# Patient Record
Sex: FEMALE | Race: WHITE | NOT HISPANIC OR LATINO
[De-identification: names, ages, dates, MRNs, and addresses within clinical notes are randomized per-mention and may not be internally consistent; named-entity substitution may affect disease eponyms.]

---

## 2017-02-24 ENCOUNTER — APPOINTMENT (OUTPATIENT)
Dept: HEART AND VASCULAR | Facility: CLINIC | Age: 52
End: 2017-02-24

## 2017-02-24 VITALS
OXYGEN SATURATION: 96 % | RESPIRATION RATE: 12 BRPM | SYSTOLIC BLOOD PRESSURE: 120 MMHG | BODY MASS INDEX: 24.03 KG/M2 | HEART RATE: 89 BPM | WEIGHT: 140 LBS | DIASTOLIC BLOOD PRESSURE: 80 MMHG | TEMPERATURE: 98.3 F

## 2017-02-24 DIAGNOSIS — F41.9 ANXIETY DISORDER, UNSPECIFIED: ICD-10-CM

## 2017-02-24 DIAGNOSIS — G47.00 INSOMNIA, UNSPECIFIED: ICD-10-CM

## 2017-02-24 RX ORDER — ZOLPIDEM TARTRATE 5 MG/1
5 TABLET ORAL
Qty: 20 | Refills: 0 | Status: ACTIVE | COMMUNITY
Start: 2017-02-24 | End: 1900-01-01

## 2017-05-31 ENCOUNTER — RESULT REVIEW (OUTPATIENT)
Age: 52
End: 2017-05-31

## 2018-08-23 ENCOUNTER — APPOINTMENT (OUTPATIENT)
Dept: OBGYN | Facility: CLINIC | Age: 53
End: 2018-08-23
Payer: COMMERCIAL

## 2018-08-23 VITALS
BODY MASS INDEX: 25.27 KG/M2 | HEIGHT: 64 IN | DIASTOLIC BLOOD PRESSURE: 82 MMHG | SYSTOLIC BLOOD PRESSURE: 120 MMHG | WEIGHT: 148 LBS

## 2018-08-23 DIAGNOSIS — Z82.49 FAMILY HISTORY OF ISCHEMIC HEART DISEASE AND OTHER DISEASES OF THE CIRCULATORY SYSTEM: ICD-10-CM

## 2018-08-23 DIAGNOSIS — Z78.9 OTHER SPECIFIED HEALTH STATUS: ICD-10-CM

## 2018-08-23 DIAGNOSIS — Z83.3 FAMILY HISTORY OF DIABETES MELLITUS: ICD-10-CM

## 2018-08-23 DIAGNOSIS — Z87.891 PERSONAL HISTORY OF NICOTINE DEPENDENCE: ICD-10-CM

## 2018-08-23 PROCEDURE — 99386 PREV VISIT NEW AGE 40-64: CPT

## 2018-09-06 ENCOUNTER — LABORATORY RESULT (OUTPATIENT)
Age: 53
End: 2018-09-06

## 2018-09-06 ENCOUNTER — APPOINTMENT (OUTPATIENT)
Dept: OBGYN | Facility: CLINIC | Age: 53
End: 2018-09-06
Payer: COMMERCIAL

## 2018-09-06 VITALS
BODY MASS INDEX: 25.31 KG/M2 | WEIGHT: 148.25 LBS | HEIGHT: 64 IN | SYSTOLIC BLOOD PRESSURE: 120 MMHG | DIASTOLIC BLOOD PRESSURE: 70 MMHG

## 2018-09-06 DIAGNOSIS — A63.0 ANOGENITAL (VENEREAL) WARTS: ICD-10-CM

## 2018-09-06 PROCEDURE — 11100 BX SKIN SUBCUTANEOUS&/MUCOUS MEMBRANE 1 LESION: CPT

## 2018-09-06 PROCEDURE — 99214 OFFICE O/P EST MOD 30 MIN: CPT | Mod: 25

## 2018-09-10 LAB
CYTOLOGY CVX/VAG DOC THIN PREP: NORMAL
HPV HIGH+LOW RISK DNA PNL CVX: NOT DETECTED

## 2018-09-18 ENCOUNTER — APPOINTMENT (OUTPATIENT)
Dept: OBGYN | Facility: CLINIC | Age: 53
End: 2018-09-18
Payer: COMMERCIAL

## 2018-09-18 DIAGNOSIS — N95.0 POSTMENOPAUSAL BLEEDING: ICD-10-CM

## 2018-09-18 PROCEDURE — 99214 OFFICE O/P EST MOD 30 MIN: CPT | Mod: 25

## 2018-09-18 PROCEDURE — 58100 BIOPSY OF UTERUS LINING: CPT

## 2018-10-09 ENCOUNTER — APPOINTMENT (OUTPATIENT)
Dept: COLORECTAL SURGERY | Facility: CLINIC | Age: 53
End: 2018-10-09
Payer: COMMERCIAL

## 2018-10-09 VITALS
SYSTOLIC BLOOD PRESSURE: 105 MMHG | BODY MASS INDEX: 24.75 KG/M2 | DIASTOLIC BLOOD PRESSURE: 69 MMHG | HEART RATE: 75 BPM | TEMPERATURE: 98.6 F | WEIGHT: 145 LBS | HEIGHT: 64 IN

## 2018-10-09 PROCEDURE — 46924 DESTRUCTION ANAL LESION(S): CPT

## 2018-10-09 PROCEDURE — 99202 OFFICE O/P NEW SF 15 MIN: CPT | Mod: 25

## 2019-10-30 ENCOUNTER — APPOINTMENT (OUTPATIENT)
Dept: OBGYN | Facility: CLINIC | Age: 54
End: 2019-10-30

## 2019-11-22 ENCOUNTER — APPOINTMENT (OUTPATIENT)
Dept: OBGYN | Facility: CLINIC | Age: 54
End: 2019-11-22

## 2019-12-02 ENCOUNTER — APPOINTMENT (OUTPATIENT)
Dept: OBGYN | Facility: CLINIC | Age: 54
End: 2019-12-02
Payer: COMMERCIAL

## 2019-12-02 VITALS
DIASTOLIC BLOOD PRESSURE: 70 MMHG | WEIGHT: 144 LBS | HEIGHT: 64 IN | BODY MASS INDEX: 24.59 KG/M2 | SYSTOLIC BLOOD PRESSURE: 100 MMHG

## 2019-12-02 DIAGNOSIS — N95.1 MENOPAUSAL AND FEMALE CLIMACTERIC STATES: ICD-10-CM

## 2019-12-02 DIAGNOSIS — A63.0 ANOGENITAL (VENEREAL) WARTS: ICD-10-CM

## 2019-12-02 PROCEDURE — 99396 PREV VISIT EST AGE 40-64: CPT

## 2019-12-03 PROBLEM — A63.0 CONDYLOMA ACUMINATUM OF ANUS: Status: ACTIVE | Noted: 2018-10-09

## 2019-12-03 PROBLEM — N95.1 VASOMOTOR SYMPTOMS DUE TO MENOPAUSE: Status: ACTIVE | Noted: 2018-08-23

## 2019-12-03 LAB — HPV HIGH+LOW RISK DNA PNL CVX: NOT DETECTED

## 2019-12-03 NOTE — PHYSICAL EXAM
[Alert] : alert [Awake] : awake [Acute Distress] : no acute distress [Mass] : no breast mass [Axillary LAD] : no axillary lymphadenopathy [Nipple Discharge] : no nipple discharge [Soft] : soft [Tender] : non tender [Oriented x3] : oriented to person, place, and time [Normal] : uterus [No Bleeding] : there was no active vaginal bleeding [Pap Obtained] : a Pap smear was performed [Uterine Adnexae] : were not tender and not enlarged

## 2019-12-05 LAB — CYTOLOGY CVX/VAG DOC THIN PREP: ABNORMAL

## 2021-04-07 ENCOUNTER — APPOINTMENT (OUTPATIENT)
Dept: OBGYN | Facility: CLINIC | Age: 56
End: 2021-04-07
Payer: MEDICAID

## 2021-04-07 PROCEDURE — 99213 OFFICE O/P EST LOW 20 MIN: CPT

## 2021-07-15 ENCOUNTER — APPOINTMENT (OUTPATIENT)
Dept: COLORECTAL SURGERY | Facility: CLINIC | Age: 56
End: 2021-07-15
Payer: MEDICAID

## 2021-07-15 VITALS
WEIGHT: 152 LBS | HEIGHT: 64 IN | SYSTOLIC BLOOD PRESSURE: 122 MMHG | TEMPERATURE: 97.3 F | BODY MASS INDEX: 25.95 KG/M2 | HEART RATE: 82 BPM | DIASTOLIC BLOOD PRESSURE: 81 MMHG

## 2021-07-15 DIAGNOSIS — L29.0 PRURITUS ANI: ICD-10-CM

## 2021-07-15 DIAGNOSIS — K64.9 UNSPECIFIED HEMORRHOIDS: ICD-10-CM

## 2021-07-15 PROCEDURE — 46600 DIAGNOSTIC ANOSCOPY SPX: CPT

## 2021-07-15 RX ORDER — OMEGA-3/DHA/EPA/FISH OIL 300-1000MG
CAPSULE ORAL
Refills: 0 | Status: ACTIVE | COMMUNITY

## 2021-07-15 RX ORDER — HYDROCORTISONE 25 MG/G
2.5 OINTMENT TOPICAL TWICE DAILY
Qty: 30 | Refills: 2 | Status: ACTIVE | COMMUNITY
Start: 2021-07-15 | End: 1900-01-01

## 2021-07-15 RX ORDER — PAROXETINE 7.5 MG/1
7.5 CAPSULE ORAL
Qty: 30 | Refills: 2 | Status: DISCONTINUED | COMMUNITY
Start: 2019-12-03 | End: 2021-07-15

## 2021-07-15 RX ORDER — ESTRADIOL AND NORETHINDRONE ACETATE 1; .5 MG/1; MG/1
1-0.5 TABLET, FILM COATED ORAL DAILY
Qty: 1 | Refills: 6 | Status: DISCONTINUED | COMMUNITY
Start: 2018-08-23 | End: 2021-07-15

## 2021-07-15 RX ORDER — CLOTRIMAZOLE AND BETAMETHASONE DIPROPIONATE 10; .5 MG/G; MG/G
1-0.05 CREAM TOPICAL TWICE DAILY
Qty: 1 | Refills: 1 | Status: ACTIVE | COMMUNITY
Start: 2021-07-15 | End: 1900-01-01

## 2021-07-15 RX ORDER — ASCORBIC ACID 500 MG
TABLET ORAL
Refills: 0 | Status: ACTIVE | COMMUNITY

## 2021-07-15 RX ORDER — CHROMIUM 200 MCG
TABLET ORAL
Refills: 0 | Status: ACTIVE | COMMUNITY

## 2021-07-15 RX ORDER — VIT C/ZN GLUC/HERBAL NO.325 90 MG-15MG
LOZENGE MUCOUS MEMBRANE
Refills: 0 | Status: ACTIVE | COMMUNITY

## 2021-07-15 NOTE — ASSESSMENT
[FreeTextEntry1] : Exam findings and diagnosis were discussed at length with patient.\par Recommend avoid scratching, avoid over-cleaning, and decreasing/eliminating triggers, such as caffeine and alcohol.\par Discussed perianal hygiene and topical barrier agents - Balmex.\par Recommend a trial of topical therapy, including hydrocortisone and lotrisone .\par If symptoms persist or worsen, patient will f/u in 4 weeks for reevaluation\par All questions answered, patient expressed understanding and is agreeable to this plan.

## 2021-07-15 NOTE — PHYSICAL EXAM
[FreeTextEntry1] : Medical assistant present for duration of physical examination\par \par General no acute distress, alert and oriented\par Psych calm, pleasant demeanor, responding appropriately to questions\par Nonlabored breathing\par Ambulating without assistance\par \par Skin Local perianal dermatitis with white plaques, red excoriations, chronic edema, consistent with pruritus ani\par \par Anorectal Exam:\par Inspection perianal skin findings as above, right anterior external hemorrhoidal tag nonthrombosed\par AMAURY nontender, no masses palpated, no blood on gloved finger\par \par Procedure: Anoscopy\par \par Pre procedure Diagnosis: pruritus, hemorrhoids\par Post procedure Diagnosis: pruritus, hemorrhoids\par Anesthesia: none\par Estimated blood loss: none\par Specimen: none\par Complications: none\par \par Consent obtained. Anoscopy was performed by passing a lighted anoscope with lubricant jelly into the anal canal and the entire anal mucosal surface was inspected. Findings included no fissure, mild internal hemorrhoids, no visible masses or lesions\par \par Patient tolerated examination and procedure well.\par \par \par \par \par \par

## 2021-07-15 NOTE — HISTORY OF PRESENT ILLNESS
[FreeTextEntry1] : 54 yo F presents for f/u anal condyloma and evaluation of hemorrhoids\par PSH of office based fulguration of anogenital lesions\par s/p  x 2 w/ episiotomies (children aged 27 and 23)\par \par Last seen in the office on 10/9/18. Minimal external hemorrhoids non thrombosed and several 1-2 mm condylomatous lesions in the left anterior perineum overlying the skin noted on external exam. Non friable internal hemorrhoids seen on anoscopy. Fulguration of lesions performed \par \par Pt c/o hemorrhoids began after delivering 2nd child 25 years ago, treated w/ Prep H in the past\par \par Current flare began 3 months ago w/o obvious aggravating factor and patient noticed swollen tissue. She started Prep H at bedtime for the last week w/o noticeable improvement\par Also believes warts may have returned and c/o itching\par Denies bleeding or burning\par c/o itching throughout the day and worsened at bedtime\par Admits to over wiping after BMs w/ wet wipes. \par Not applying any topical creams besides Prep H\par BH: once daily, no complaints\par Follows vegetarian diet and reports adequate dietary fiber and water intake \par Takes fiber supplement daily\par (+) h/o anal receptive sex\par Never had an anal pap\par Last cervical pap smear completed 19 was negative HRHPV. Pt has f/u with GYN Claudia Salazar in 1-2 weeks\par Last colonoscopy completed , no abnormal findings per patient \par Denies ASA/NSAID use in last 7 days

## 2021-07-20 ENCOUNTER — APPOINTMENT (OUTPATIENT)
Dept: OBGYN | Facility: CLINIC | Age: 56
End: 2021-07-20
Payer: MEDICAID

## 2021-07-20 VITALS
WEIGHT: 151 LBS | BODY MASS INDEX: 25.78 KG/M2 | SYSTOLIC BLOOD PRESSURE: 140 MMHG | DIASTOLIC BLOOD PRESSURE: 80 MMHG | HEIGHT: 64 IN

## 2021-07-20 DIAGNOSIS — Z00.00 ENCOUNTER FOR GENERAL ADULT MEDICAL EXAMINATION W/OUT ABNORMAL FINDINGS: ICD-10-CM

## 2021-07-20 PROCEDURE — 99396 PREV VISIT EST AGE 40-64: CPT

## 2021-07-20 NOTE — PHYSICAL EXAM
[Appropriately responsive] : appropriately responsive [Alert] : alert [No Acute Distress] : no acute distress [No Lymphadenopathy] : no lymphadenopathy [Regular Rate Rhythm] : regular rate rhythm [No Murmurs] : no murmurs [Clear to Auscultation B/L] : clear to auscultation bilaterally [Soft] : soft [Non-tender] : non-tender [Non-distended] : non-distended [No HSM] : No HSM [No Lesions] : no lesions [No Mass] : no mass [Oriented x3] : oriented x3 [Examination Of The Breasts] : a normal appearance [No Masses] : no breast masses were palpable [Labia Majora] : normal [Labia Minora] : normal [Atrophy] : atrophy [Normal] : normal [Uterine Adnexae] : normal [External Hemorrhoid] : external hemorrhoid

## 2021-07-20 NOTE — HISTORY OF PRESENT ILLNESS
[Patient reported mammogram was normal] : Patient reported mammogram was normal [Patient reported PAP Smear was normal] : Patient reported PAP Smear was normal [postmenopausal] : postmenopausal [Post-Menopause, No Sxs] : post-menopausal, currently without symptoms [Previously active] : previously active [Men] : men [No] : No [FreeTextEntry1] : Patient is here for an annual visit without complaints.\par LISET TOVAR IS A 55 year OLD WHO PRESENTS FOR AN ANNUAL GYN EXAM.  SHE IS WITHOUT COMPLAINTS.\par MS. TOVAR REPORTS THAT SHE PRESENTED IN [facility] FOR HER ROUTINE EXAM BUT LEFT WITHOUT BEING SEEN BECAUSE OF THE WAIT.  \par SHE WAS RECENTLY SEEN BY DR. VILLA, GI FOR RECTAL EXAM, SCREENING COLONOSCOPY AND ANAL PAP DUE TO A RECENT FLARE OF HEMORRHOIDS.   [Mammogramdate] : 07/2021 [PapSmeardate] : 2019

## 2021-07-30 ENCOUNTER — APPOINTMENT (OUTPATIENT)
Dept: OTOLARYNGOLOGY | Facility: CLINIC | Age: 56
End: 2021-07-30
Payer: MEDICAID

## 2021-07-30 VITALS
TEMPERATURE: 97.6 F | DIASTOLIC BLOOD PRESSURE: 98 MMHG | HEIGHT: 64 IN | WEIGHT: 149 LBS | BODY MASS INDEX: 25.44 KG/M2 | HEART RATE: 96 BPM | SYSTOLIC BLOOD PRESSURE: 147 MMHG

## 2021-07-30 DIAGNOSIS — H91.93 UNSPECIFIED HEARING LOSS, BILATERAL: ICD-10-CM

## 2021-07-30 DIAGNOSIS — H61.23 IMPACTED CERUMEN, BILATERAL: ICD-10-CM

## 2021-07-30 PROCEDURE — 99203 OFFICE O/P NEW LOW 30 MIN: CPT | Mod: 25

## 2021-07-30 PROCEDURE — 69210 REMOVE IMPACTED EAR WAX UNI: CPT

## 2021-08-01 PROBLEM — H61.23 BILATERAL IMPACTED CERUMEN: Status: ACTIVE | Noted: 2021-08-01

## 2021-08-01 PROBLEM — H91.93 DIMINISHED HEARING, BILATERAL: Status: ACTIVE | Noted: 2021-08-01

## 2021-08-01 RX ORDER — AZITHROMYCIN 250 MG/1
250 TABLET, FILM COATED ORAL
Qty: 6 | Refills: 0 | Status: ACTIVE | COMMUNITY
Start: 2021-05-17

## 2021-08-01 RX ORDER — ZOLPIDEM TARTRATE 10 MG/1
10 TABLET ORAL
Qty: 15 | Refills: 0 | Status: ACTIVE | COMMUNITY
Start: 2021-07-01

## 2021-08-01 RX ORDER — AMMONIUM LACTATE 12 %
12 CREAM (GRAM) TOPICAL
Qty: 280 | Refills: 0 | Status: ACTIVE | COMMUNITY
Start: 2021-06-29

## 2021-08-01 NOTE — ASSESSMENT
[FreeTextEntry1] : 55F here for initial evaluation. She feels her ears are clogged with diminished hearing and was told there is cerumen. There is no otorrhea, otalgia, tinnitus or vertigo. On exam, obstructing cerumen was removed from either EAC w relief. The rest of the head and neck exam is unremarkable. She declined audiometry.\par Cerumen removed; RTO 9-12 months for ear exam/cleaning.

## 2021-08-01 NOTE — HISTORY OF PRESENT ILLNESS
[de-identified] : 55F here for initial evaluation.\par \par She feels her ears are clogged with diminished hearing and was told there is cerumen. There is no otorrhea, otalgia, tinnitus or vertigo. She has no other otolaryngologic complaints.\par \par ROS otherwise unremarkable.

## 2021-08-01 NOTE — PHYSICAL EXAM
[FreeTextEntry1] : Au: EAC occluded w cerumen removed w curet TM intact and mobile, ME clear [Normal] : no rashes

## 2021-08-01 NOTE — CONSULT LETTER
[Dear  ___] : Dear  [unfilled], [Courtesy Letter:] : I had the pleasure of seeing your patient, [unfilled], in my office today. [Consult Closing:] : Thank you very much for allowing me to participate in the care of this patient.  If you have any questions, please do not hesitate to contact me. [Sincerely,] : Sincerely, [FreeTextEntry3] : Jesús Ariza MD\par Department of Otolaryngology - Head and Neck Surgery\par [facility]

## 2021-08-03 LAB — CYTOLOGY CVX/VAG DOC THIN PREP: NORMAL

## 2022-04-21 ENCOUNTER — TRANSCRIPTION ENCOUNTER (OUTPATIENT)
Age: 57
End: 2022-04-21

## 2022-04-21 ENCOUNTER — EMERGENCY (EMERGENCY)
Facility: HOSPITAL | Age: 57
LOS: 1 days | Discharge: HOME | End: 2022-04-21
Admitting: EMERGENCY MEDICINE
Payer: MEDICAID

## 2022-04-21 VITALS
DIASTOLIC BLOOD PRESSURE: 88 MMHG | RESPIRATION RATE: 18 BRPM | TEMPERATURE: 98 F | OXYGEN SATURATION: 99 % | SYSTOLIC BLOOD PRESSURE: 132 MMHG | HEART RATE: 88 BPM

## 2022-04-21 VITALS
HEIGHT: 63 IN | OXYGEN SATURATION: 98 % | HEART RATE: 89 BPM | TEMPERATURE: 98 F | WEIGHT: 149.03 LBS | RESPIRATION RATE: 18 BRPM | SYSTOLIC BLOOD PRESSURE: 140 MMHG | DIASTOLIC BLOOD PRESSURE: 89 MMHG

## 2022-04-21 DIAGNOSIS — M54.6 PAIN IN THORACIC SPINE: ICD-10-CM

## 2022-04-21 DIAGNOSIS — Z98.89 OTHER SPECIFIED POSTPROCEDURAL STATES: Chronic | ICD-10-CM

## 2022-04-21 LAB
ALBUMIN SERPL ELPH-MCNC: 4.6 G/DL — SIGNIFICANT CHANGE UP (ref 3.3–5)
ALP SERPL-CCNC: 120 U/L — SIGNIFICANT CHANGE UP (ref 40–120)
ALT FLD-CCNC: 24 U/L — SIGNIFICANT CHANGE UP (ref 10–45)
ANION GAP SERPL CALC-SCNC: 18 MMOL/L — HIGH (ref 5–17)
APPEARANCE UR: CLEAR — SIGNIFICANT CHANGE UP
AST SERPL-CCNC: 29 U/L — SIGNIFICANT CHANGE UP (ref 10–40)
BACTERIA # UR AUTO: PRESENT /HPF
BILIRUB SERPL-MCNC: <0.2 MG/DL — SIGNIFICANT CHANGE UP (ref 0.2–1.2)
BILIRUB UR-MCNC: NEGATIVE — SIGNIFICANT CHANGE UP
BUN SERPL-MCNC: 11 MG/DL — SIGNIFICANT CHANGE UP (ref 7–23)
CALCIUM SERPL-MCNC: 9.8 MG/DL — SIGNIFICANT CHANGE UP (ref 8.4–10.5)
CHLORIDE SERPL-SCNC: 104 MMOL/L — SIGNIFICANT CHANGE UP (ref 96–108)
CO2 SERPL-SCNC: 18 MMOL/L — LOW (ref 22–31)
COLOR SPEC: YELLOW — SIGNIFICANT CHANGE UP
CREAT SERPL-MCNC: 0.61 MG/DL — SIGNIFICANT CHANGE UP (ref 0.5–1.3)
DIFF PNL FLD: ABNORMAL
EGFR: 104 ML/MIN/1.73M2 — SIGNIFICANT CHANGE UP
EPI CELLS # UR: SIGNIFICANT CHANGE UP /HPF (ref 0–5)
GLUCOSE SERPL-MCNC: 110 MG/DL — HIGH (ref 70–99)
GLUCOSE UR QL: NEGATIVE — SIGNIFICANT CHANGE UP
KETONES UR-MCNC: NEGATIVE — SIGNIFICANT CHANGE UP
LEUKOCYTE ESTERASE UR-ACNC: NEGATIVE — SIGNIFICANT CHANGE UP
LIDOCAIN IGE QN: 25 U/L — SIGNIFICANT CHANGE UP (ref 7–60)
NITRITE UR-MCNC: NEGATIVE — SIGNIFICANT CHANGE UP
PH UR: 6 — SIGNIFICANT CHANGE UP (ref 5–8)
POTASSIUM SERPL-MCNC: 4.9 MMOL/L — SIGNIFICANT CHANGE UP (ref 3.5–5.3)
POTASSIUM SERPL-SCNC: 4.9 MMOL/L — SIGNIFICANT CHANGE UP (ref 3.5–5.3)
PROT SERPL-MCNC: 7.4 G/DL — SIGNIFICANT CHANGE UP (ref 6–8.3)
PROT UR-MCNC: NEGATIVE MG/DL — SIGNIFICANT CHANGE UP
RBC CASTS # UR COMP ASSIST: < 5 /HPF — SIGNIFICANT CHANGE UP
SODIUM SERPL-SCNC: 140 MMOL/L — SIGNIFICANT CHANGE UP (ref 135–145)
SP GR SPEC: 1.01 — SIGNIFICANT CHANGE UP (ref 1–1.03)
TROPONIN T SERPL-MCNC: 0.01 NG/ML — SIGNIFICANT CHANGE UP (ref 0–0.01)
UROBILINOGEN FLD QL: 0.2 E.U./DL — SIGNIFICANT CHANGE UP
WBC UR QL: < 5 /HPF — SIGNIFICANT CHANGE UP

## 2022-04-21 PROCEDURE — 81001 URINALYSIS AUTO W/SCOPE: CPT

## 2022-04-21 PROCEDURE — 71046 X-RAY EXAM CHEST 2 VIEWS: CPT | Mod: 26

## 2022-04-21 PROCEDURE — 71046 X-RAY EXAM CHEST 2 VIEWS: CPT

## 2022-04-21 PROCEDURE — 99285 EMERGENCY DEPT VISIT HI MDM: CPT | Mod: 25

## 2022-04-21 PROCEDURE — 36415 COLL VENOUS BLD VENIPUNCTURE: CPT

## 2022-04-21 PROCEDURE — 80053 COMPREHEN METABOLIC PANEL: CPT

## 2022-04-21 PROCEDURE — 93005 ELECTROCARDIOGRAM TRACING: CPT

## 2022-04-21 PROCEDURE — 96374 THER/PROPH/DIAG INJ IV PUSH: CPT

## 2022-04-21 PROCEDURE — 84484 ASSAY OF TROPONIN QUANT: CPT

## 2022-04-21 PROCEDURE — 93010 ELECTROCARDIOGRAM REPORT: CPT

## 2022-04-21 PROCEDURE — 83690 ASSAY OF LIPASE: CPT

## 2022-04-21 RX ORDER — KETOROLAC TROMETHAMINE 30 MG/ML
15 SYRINGE (ML) INJECTION ONCE
Refills: 0 | Status: DISCONTINUED | OUTPATIENT
Start: 2022-04-21 | End: 2022-04-21

## 2022-04-21 RX ORDER — LIDOCAINE 4 G/100G
1 CREAM TOPICAL
Qty: 10 | Refills: 0
Start: 2022-04-21 | End: 2022-04-25

## 2022-04-21 RX ORDER — CYCLOBENZAPRINE HYDROCHLORIDE 10 MG/1
1 TABLET, FILM COATED ORAL
Qty: 10 | Refills: 0
Start: 2022-04-21 | End: 2022-04-25

## 2022-04-21 RX ORDER — LIDOCAINE 4 G/100G
1 CREAM TOPICAL ONCE
Refills: 0 | Status: COMPLETED | OUTPATIENT
Start: 2022-04-21 | End: 2022-04-21

## 2022-04-21 RX ADMIN — Medication 15 MILLIGRAM(S): at 20:33

## 2022-04-21 RX ADMIN — LIDOCAINE 1 PATCH: 4 CREAM TOPICAL at 20:33

## 2022-04-21 NOTE — ED PROVIDER NOTE - INTERPRETATION
normal sinus rhythm, Normal axis, Normal OK interval and QRS complex. There are no acute ischemic ST or T-wave changes. normal sinus rhythm

## 2022-04-21 NOTE — ED PROVIDER NOTE - NSFOLLOWUPINSTRUCTIONS_ED_ALL_ED_FT
Take one tab of ibuprofen 600mg up to three times daily for back pain and inflammation.    Take one tab of flexeril 10mg up to twice daily for back pain and muscle spasm. This medication may make you drowsy and you should not take it while driving.    Apply lidocaine patch over the lower back twice daily for additional pain relief. Using a heating pad or moist heat may also be helpful.    Please follow up with your primary care doctor.    Return to the Emergency Department if you develop fever>100.4F, worsening back pain, numbness or weakness of legs, loss of control of bowels or bladder, or any other concerns.      Muscle Cramps and Spasms      Muscle cramps and spasms occur when a muscle or muscles tighten and you have no control over this tightening (involuntary muscle contraction). They are a common problem and can develop in any muscle. The most common place is in the calf muscles of the leg. Muscle cramps and muscle spasms are both involuntary muscle contractions, but there are some differences between the two:  •Muscle cramps are painful. They come and go and may last for a few seconds or up to 15 minutes. Muscle cramps are often more forceful and last longer than muscle spasms.      •Muscle spasms may or may not be painful. They may also last just a few seconds or much longer.      Certain medical conditions, such as diabetes or Parkinson's disease, can make it more likely to develop cramps or spasms. However, cramps or spasms are usually not caused by a serious underlying problem. Common causes include:  •Doing more physical work or exercise than your body is ready for (overexertion).      •Overuse from repeating certain movements too many times.      •Remaining in a certain position for a long period of time.      •Improper preparation, form, or technique while playing a sport or doing an activity.      •Dehydration.      •Injury.      •Side effects of some medicines.      •Abnormally low levels of the salts and minerals in your blood (electrolytes), especially potassium and calcium. This could happen if you are taking water pills (diuretics) or if you are pregnant.      In many cases, the cause of muscle cramps or spasms is not known.      Follow these instructions at home:      Managing pain and stiffness                   •Try massaging, stretching, and relaxing the affected muscle. Do this for several minutes at a time.    •If directed, apply heat to tight or tense muscles as often as told by your health care provider. Use the heat source that your health care provider recommends, such as a moist heat pack or a heating pad.  •Place a towel between your skin and the heat source.      •Leave the heat on for 20–30 minutes.      •Remove the heat if your skin turns bright red. This is especially important if you are unable to feel pain, heat, or cold. You may have a greater risk of getting burned.      •If directed, put ice on the affected area. This may help if you are sore or have pain after a cramp or spasm.  •Put ice in a plastic bag.      •Place a towel between your skin and the bag.      •Leave the ice on for 20 minutes, 2–3 times a day.        •Try taking hot showers or baths to help relax tight muscles.      Eating and drinking     •Drink enough fluid to keep your urine pale yellow. Staying well hydrated may help prevent cramps or spasms.      •Eat a healthy diet that includes plenty of nutrients to help your muscles function. A healthy diet includes fruits and vegetables, lean protein, whole grains, and low-fat or nonfat dairy products.      General instructions     •If you are having frequent cramps, avoid intense exercise for several days.      •Take over-the-counter and prescription medicines only as told by your health care provider.      •Pay attention to any changes in your symptoms.      •Keep all follow-up visits as told by your health care provider. This is important.        Contact a health care provider if:    •Your cramps or spasms get more severe or happen more often.      •Your cramps or spasms do not improve over time.        Summary    •Muscle cramps and spasms occur when a muscle or muscles tighten and you have no control over this tightening (involuntary muscle contraction).      •The most common place for cramps or spasms to occur is in the calf muscles of the leg.      •Massaging, stretching, and relaxing the affected muscle may relieve the cramp or spasm.      •Drink enough fluid to keep your urine pale yellow. Staying well hydrated may help prevent cramps or spasms.      This information is not intended to replace advice given to you by your health care provider. Make sure you discuss any questions you have with your health care provider. Please see your primary care provider for followup.  Call for appointment.  If you have any problems with followup, please call the ED Referral Coordinator at [phone].  Return to the ER if symptoms worsen or other concerns.    Take one tab of ibuprofen 600mg up to three times daily for back pain and inflammation.    Take one tab of flexeril 10mg up to twice daily for back pain and muscle spasm. This medication may make you drowsy and you should not take it while driving.    Apply lidocaine patch over the lower back twice daily for additional pain relief. Using a heating pad or moist heat may also be helpful.    Please follow up with your primary care doctor.    Return to the Emergency Department if you develop fever>100.4F, worsening back pain, numbness or weakness of legs, loss of control of bowels or bladder, or any other concerns.      Muscle Cramps and Spasms      Muscle cramps and spasms occur when a muscle or muscles tighten and you have no control over this tightening (involuntary muscle contraction). They are a common problem and can develop in any muscle. The most common place is in the calf muscles of the leg. Muscle cramps and muscle spasms are both involuntary muscle contractions, but there are some differences between the two:  •Muscle cramps are painful. They come and go and may last for a few seconds or up to 15 minutes. Muscle cramps are often more forceful and last longer than muscle spasms.      •Muscle spasms may or may not be painful. They may also last just a few seconds or much longer.      Certain medical conditions, such as diabetes or Parkinson's disease, can make it more likely to develop cramps or spasms. However, cramps or spasms are usually not caused by a serious underlying problem. Common causes include:  •Doing more physical work or exercise than your body is ready for (overexertion).      •Overuse from repeating certain movements too many times.      •Remaining in a certain position for a long period of time.      •Improper preparation, form, or technique while playing a sport or doing an activity.      •Dehydration.      •Injury.      •Side effects of some medicines.      •Abnormally low levels of the salts and minerals in your blood (electrolytes), especially potassium and calcium. This could happen if you are taking water pills (diuretics) or if you are pregnant.      In many cases, the cause of muscle cramps or spasms is not known.      Follow these instructions at home:      Managing pain and stiffness                   •Try massaging, stretching, and relaxing the affected muscle. Do this for several minutes at a time.    •If directed, apply heat to tight or tense muscles as often as told by your health care provider. Use the heat source that your health care provider recommends, such as a moist heat pack or a heating pad.  •Place a towel between your skin and the heat source.      •Leave the heat on for 20–30 minutes.      •Remove the heat if your skin turns bright red. This is especially important if you are unable to feel pain, heat, or cold. You may have a greater risk of getting burned.      •If directed, put ice on the affected area. This may help if you are sore or have pain after a cramp or spasm.  •Put ice in a plastic bag.      •Place a towel between your skin and the bag.      •Leave the ice on for 20 minutes, 2–3 times a day.        •Try taking hot showers or baths to help relax tight muscles.      Eating and drinking     •Drink enough fluid to keep your urine pale yellow. Staying well hydrated may help prevent cramps or spasms.      •Eat a healthy diet that includes plenty of nutrients to help your muscles function. A healthy diet includes fruits and vegetables, lean protein, whole grains, and low-fat or nonfat dairy products.      General instructions     •If you are having frequent cramps, avoid intense exercise for several days.      •Take over-the-counter and prescription medicines only as told by your health care provider.      •Pay attention to any changes in your symptoms.      •Keep all follow-up visits as told by your health care provider. This is important.        Contact a health care provider if:    •Your cramps or spasms get more severe or happen more often.      •Your cramps or spasms do not improve over time.        Summary    •Muscle cramps and spasms occur when a muscle or muscles tighten and you have no control over this tightening (involuntary muscle contraction).      •The most common place for cramps or spasms to occur is in the calf muscles of the leg.      •Massaging, stretching, and relaxing the affected muscle may relieve the cramp or spasm.      •Drink enough fluid to keep your urine pale yellow. Staying well hydrated may help prevent cramps or spasms.      This information is not intended to replace advice given to you by your health care provider. Make sure you discuss any questions you have with your health care provider.

## 2022-04-21 NOTE — ED PROVIDER NOTE - PHYSICAL EXAMINATION
VITAL SIGNS: I have reviewed nursing notes and confirm.  CONSTITUTIONAL: Well-developed; in no acute distress.   SKIN:  warm and dry, no acute rash.   HEAD:  normocephalic, atraumatic.  EYES: PERRL, EOM intact; conjunctiva and sclera clear.  ENT: No nasal discharge; airway clear.   NECK: Supple; non tender.  BACK: Mild ttp in left subscapular region. No overlying rash. No midline spinal tenderness.   CARD: S1, S2 normal; no murmurs, gallops, or rubs. Regular rate and rhythm.   RESP:  Clear to auscultation b/l, no wheezes, rales or rhonchi.  ABD: Normal bowel sounds; soft; non-distended; non-tender; no guarding/ rebound.  EXT: Normal ROM. No clubbing, cyanosis or edema. 2+ pulses to b/l ue/le.  NEURO: Alert, oriented, grossly unremarkable  PSYCH: Cooperative, mood and affect appropriate. No

## 2022-04-21 NOTE — ED ADULT NURSE NOTE - OBJECTIVE STATEMENT
Pt AOX4. Pt c/o 6 days of left thoracic back pain. Pt denies injury or trauma. Reports pain radiates over lateral ribs today. Pt denies fevers, chills, n/v, SOB, chest pain, numbness, tingling, weakness, dizziness. Pt speaking in full complete sentences. Respirations even and unlabored. Pt ambulates steadily. Denies cardiac hx.

## 2022-04-21 NOTE — ED PROVIDER NOTE - NS ED ROS FT
Constitutional: No fever. No chills.  Eyes: No redness. No discharge. No vision change.   ENT: No sore throat. No ear pain.  Cardiovascular: No chest pain. No leg swelling.  Respiratory: No cough. No shortness of breath.  GI: No abdominal pain. No vomiting. No diarrhea.   MSK: No joint pain. +back pain.   Skin: No rash. No abrasions.   Neuro: No numbness. No weakness.   Psych: No known mental health issues.

## 2022-04-21 NOTE — ED PROVIDER NOTE - OBJECTIVE STATEMENT
55yo female with no reported pmhx presents with 6 days of left thoracic back pain. She denies preceding injury or trauma. She reports pain inferior to left scapula, began to radiate over lateral ribs today so came to ED. She denies fever, chills, chest pain, cough, shortness of breath, chest pain, abdominal pain, vomiting, urinary symptoms. She denies history of cardiac disease. She denies prior back injuries or surgeries. She has been taking ibuprofen at home without relief.

## 2022-04-21 NOTE — ED PROVIDER NOTE - PATIENT PORTAL LINK FT
You can access the FollowMyHealth Patient Portal offered by [facility] by registering at the following website: [email or web address]. By joining [employer]’s FollowMyHealth portal, you will also be able to view your health information using other applications (apps) compatible with our system.

## 2022-06-07 ENCOUNTER — NON-APPOINTMENT (OUTPATIENT)
Age: 57
End: 2022-06-07

## 2022-06-08 ENCOUNTER — APPOINTMENT (OUTPATIENT)
Dept: COLORECTAL SURGERY | Facility: CLINIC | Age: 57
End: 2022-06-08

## 2022-06-08 ENCOUNTER — NON-APPOINTMENT (OUTPATIENT)
Age: 57
End: 2022-06-08

## 2022-06-08 VITALS
DIASTOLIC BLOOD PRESSURE: 77 MMHG | SYSTOLIC BLOOD PRESSURE: 115 MMHG | BODY MASS INDEX: 25.44 KG/M2 | WEIGHT: 149 LBS | HEART RATE: 75 BPM | HEIGHT: 64 IN | TEMPERATURE: 97.8 F

## 2022-06-08 DIAGNOSIS — K64.5 PERIANAL VENOUS THROMBOSIS: ICD-10-CM

## 2022-06-08 PROCEDURE — 46600 DIAGNOSTIC ANOSCOPY SPX: CPT

## 2022-06-08 PROCEDURE — 99213 OFFICE O/P EST LOW 20 MIN: CPT | Mod: 25

## 2022-06-08 RX ORDER — HYDROCORTISONE 25 MG/G
2.5 CREAM TOPICAL 3 TIMES DAILY
Qty: 1 | Refills: 3 | Status: ACTIVE | COMMUNITY
Start: 2022-06-08 | End: 1900-01-01

## 2022-06-08 NOTE — ASSESSMENT
[FreeTextEntry1] : Exam findings and diagnosis were discussed at length with patient. \par Continue bowel regimen, avoid constipation/diarrhea, avoid pushing/straining.\par Medical management, such as hydrocortisone cream, was discussed. Continue sitz baths.\par Natural history of thrombosed hemorrhoids discussed with patient. \par Continue supportive care. F/u as needed if no improvement.\par All questions answered, patient expressed understanding and is agreeable to this plan.\par

## 2022-06-08 NOTE — PHYSICAL EXAM
[FreeTextEntry1] : Medical assistant present for duration of physical examination\par \par General no acute distress, alert and oriented\par Psych calm, pleasant demeanor, responding appropriately to questions\par Nonlabored breathing\par Ambulating without assistance\par Skin normal color and pigment\par \par Anorectal Exam:\par Inspection no cellulitis or skin changes, left posterolateral resolving thrombosed external hemorrhoid, nontender, approx size of pea.\par AMAURY nontender, no masses palpated, no blood on gloved finger\par \par Procedure: Anoscopy\par \par Pre procedure Diagnosis: thrombosed external hemorrhoid\par Post procedure Diagnosis: thrombosed external hemorrhoid\par Anesthesia: none\par Estimated blood loss: none\par Specimen: none\par Complications: none\par \par Consent obtained. Anoscopy was performed by passing a lighted anoscope with lubricant jelly into the anal canal and the entire anal mucosal surface was inspected. Findings included no fissure, internal hemorrhoidal tissue without inflammation or thrombosis\par \par Patient tolerated examination and procedure well.\par \par \par

## 2022-06-08 NOTE — HISTORY OF PRESENT ILLNESS
[FreeTextEntry1] : 54 yo F presents for f/u anal condyloma and hemorrhoids\par PSH of office based fulguration of anogenital lesions\par s/p  x 2 w/ episiotomies (children aged 29 and 23)\par \par Pt initially seen 10/9/18 in which exam was notable for minimal external hemorrhoids non-thrombosed and several 1-2mm condylomatous lesions in the left anterior perineum. Fulguration of lesions was performed. \par \par Pt last seen 7/15/21 c/o hemorrhoidal swelling and itching. Exam notable for local perianal dermatitis with white plaques, red excoriations, chronic edema, consistent with pruritus ani. Anoscopy revealed mild internal hemorrhoids. \par Pt was advised of supportive measures, to avoid scratching and eliminate triggers such as caffeine and alcohol. Discussed use of barrier agents such as balmex, trial of HC cream and lotrisone ointment. \par \par Moved bowels 6 days ago, wiped and noticed a large lump. Denies pushing or straining or constipation.\par No BRBPR. No pain. Some itchiness.\par Tried sitz bath daily and preparation H. Noticed it somewhat decreased in size, but still there so called for appointment.\par Tried also using EumaiadMAX homeopathic cream that she saw on TikTok, she did not find it helpful.\par She takes SuperCleanse daily - contains 14 Herbs and probiotic - for years. Daily BM.\par Not using HC or lotrisone any more, hasn't for a long time.\par \par No change in health.\par Never got COVID19.\par No new medications.\par No blood thinners

## 2022-07-21 ENCOUNTER — APPOINTMENT (OUTPATIENT)
Dept: COLORECTAL SURGERY | Facility: CLINIC | Age: 57
End: 2022-07-21

## 2022-07-28 ENCOUNTER — APPOINTMENT (OUTPATIENT)
Dept: OBGYN | Facility: CLINIC | Age: 57
End: 2022-07-28

## 2022-07-28 VITALS
BODY MASS INDEX: 25.61 KG/M2 | SYSTOLIC BLOOD PRESSURE: 120 MMHG | WEIGHT: 150 LBS | HEIGHT: 64 IN | DIASTOLIC BLOOD PRESSURE: 80 MMHG

## 2022-07-28 DIAGNOSIS — Z01.419 ENCOUNTER FOR GYNECOLOGICAL EXAMINATION (GENERAL) (ROUTINE) W/OUT ABNORMAL FINDINGS: ICD-10-CM

## 2022-07-28 PROCEDURE — 99396 PREV VISIT EST AGE 40-64: CPT

## 2022-08-02 ENCOUNTER — NON-APPOINTMENT (OUTPATIENT)
Age: 57
End: 2022-08-02

## 2022-08-02 LAB — CYTOLOGY CVX/VAG DOC THIN PREP: ABNORMAL

## 2023-02-26 ENCOUNTER — EMERGENCY (EMERGENCY)
Facility: HOSPITAL | Age: 58
LOS: 1 days | Discharge: HOME | End: 2023-02-26
Attending: STUDENT IN AN ORGANIZED HEALTH CARE EDUCATION/TRAINING PROGRAM | Admitting: STUDENT IN AN ORGANIZED HEALTH CARE EDUCATION/TRAINING PROGRAM
Payer: MEDICAID

## 2023-02-26 VITALS
HEART RATE: 84 BPM | SYSTOLIC BLOOD PRESSURE: 149 MMHG | DIASTOLIC BLOOD PRESSURE: 94 MMHG | TEMPERATURE: 98 F | OXYGEN SATURATION: 98 % | HEIGHT: 63 IN | RESPIRATION RATE: 18 BRPM | WEIGHT: 141.98 LBS

## 2023-02-26 DIAGNOSIS — Z98.89 OTHER SPECIFIED POSTPROCEDURAL STATES: Chronic | ICD-10-CM

## 2023-02-26 LAB
ANION GAP SERPL CALC-SCNC: 11 MMOL/L — SIGNIFICANT CHANGE UP (ref 5–17)
BASOPHILS # BLD AUTO: 0.02 K/UL — SIGNIFICANT CHANGE UP (ref 0–0.2)
BASOPHILS NFR BLD AUTO: 0.3 % — SIGNIFICANT CHANGE UP (ref 0–2)
BUN SERPL-MCNC: 13 MG/DL — SIGNIFICANT CHANGE UP (ref 7–23)
CALCIUM SERPL-MCNC: 9.5 MG/DL — SIGNIFICANT CHANGE UP (ref 8.4–10.5)
CHLORIDE SERPL-SCNC: 104 MMOL/L — SIGNIFICANT CHANGE UP (ref 96–108)
CK SERPL-CCNC: 59 U/L — SIGNIFICANT CHANGE UP (ref 25–170)
CO2 SERPL-SCNC: 25 MMOL/L — SIGNIFICANT CHANGE UP (ref 22–31)
CREAT SERPL-MCNC: 0.59 MG/DL — SIGNIFICANT CHANGE UP (ref 0.5–1.3)
EGFR: 104 ML/MIN/1.73M2 — SIGNIFICANT CHANGE UP
EOSINOPHIL # BLD AUTO: 0.06 K/UL — SIGNIFICANT CHANGE UP (ref 0–0.5)
EOSINOPHIL NFR BLD AUTO: 1 % — SIGNIFICANT CHANGE UP (ref 0–6)
GLUCOSE SERPL-MCNC: 93 MG/DL — SIGNIFICANT CHANGE UP (ref 70–99)
HCT VFR BLD CALC: 40.6 % — SIGNIFICANT CHANGE UP (ref 34.5–45)
HGB BLD-MCNC: 13.6 G/DL — SIGNIFICANT CHANGE UP (ref 11.5–15.5)
IMM GRANULOCYTES NFR BLD AUTO: 0.3 % — SIGNIFICANT CHANGE UP (ref 0–0.9)
LYMPHOCYTES # BLD AUTO: 1.79 K/UL — SIGNIFICANT CHANGE UP (ref 1–3.3)
LYMPHOCYTES # BLD AUTO: 28.4 % — SIGNIFICANT CHANGE UP (ref 13–44)
MCHC RBC-ENTMCNC: 28.9 PG — SIGNIFICANT CHANGE UP (ref 27–34)
MCHC RBC-ENTMCNC: 33.5 GM/DL — SIGNIFICANT CHANGE UP (ref 32–36)
MCV RBC AUTO: 86.4 FL — SIGNIFICANT CHANGE UP (ref 80–100)
MONOCYTES # BLD AUTO: 0.36 K/UL — SIGNIFICANT CHANGE UP (ref 0–0.9)
MONOCYTES NFR BLD AUTO: 5.7 % — SIGNIFICANT CHANGE UP (ref 2–14)
NEUTROPHILS # BLD AUTO: 4.06 K/UL — SIGNIFICANT CHANGE UP (ref 1.8–7.4)
NEUTROPHILS NFR BLD AUTO: 64.3 % — SIGNIFICANT CHANGE UP (ref 43–77)
NRBC # BLD: 0 /100 WBCS — SIGNIFICANT CHANGE UP (ref 0–0)
PLATELET # BLD AUTO: 291 K/UL — SIGNIFICANT CHANGE UP (ref 150–400)
POTASSIUM SERPL-MCNC: 4.7 MMOL/L — SIGNIFICANT CHANGE UP (ref 3.5–5.3)
POTASSIUM SERPL-SCNC: 4.7 MMOL/L — SIGNIFICANT CHANGE UP (ref 3.5–5.3)
RBC # BLD: 4.7 M/UL — SIGNIFICANT CHANGE UP (ref 3.8–5.2)
RBC # FLD: 12 % — SIGNIFICANT CHANGE UP (ref 10.3–14.5)
SODIUM SERPL-SCNC: 140 MMOL/L — SIGNIFICANT CHANGE UP (ref 135–145)
WBC # BLD: 6.31 K/UL — SIGNIFICANT CHANGE UP (ref 3.8–10.5)
WBC # FLD AUTO: 6.31 K/UL — SIGNIFICANT CHANGE UP (ref 3.8–10.5)

## 2023-02-26 PROCEDURE — 99284 EMERGENCY DEPT VISIT MOD MDM: CPT

## 2023-02-26 PROCEDURE — 80048 BASIC METABOLIC PNL TOTAL CA: CPT

## 2023-02-26 PROCEDURE — 85025 COMPLETE CBC W/AUTO DIFF WBC: CPT

## 2023-02-26 PROCEDURE — 99283 EMERGENCY DEPT VISIT LOW MDM: CPT

## 2023-02-26 PROCEDURE — 36415 COLL VENOUS BLD VENIPUNCTURE: CPT

## 2023-02-26 PROCEDURE — 82550 ASSAY OF CK (CPK): CPT

## 2023-02-26 NOTE — ED ADULT NURSE NOTE - OBJECTIVE STATEMENT
Pt received aaox3 c/o bilateral lower extremity cramping and "feeling cold". Pt was covid + last week. Pt has + pulses of bilateral feet, pt ambulating with a steady gait. Pt denies any  SOB, cough, or CP.

## 2023-02-26 NOTE — ED PROVIDER NOTE - PATIENT PORTAL LINK FT
You can access the FollowMyHealth Patient Portal offered by [facility] by registering at the following website: [email or web address]. By joining [employer]’s FollowMyHealth portal, you will also be able to view your health information using other applications (apps) compatible with our system.

## 2023-02-26 NOTE — ED PROVIDER NOTE - NS ED ROS FT
Constitutional: No fever or chills  Eyes: No discharge or drainage  Ears, Nose, Mouth, Throat: No nasal discharge, no sore throat  Cardiovascular: No chest pain, no palpitations  Respiratory: No shortness of breath, no cough  Gastrointestinal: No nausea or vomiting, no abdominal pain, no diarrhea or constipation  Musculoskeletal: No joint pain, no swelling  Extremities: Sensation of hand and feel coldness  Skin: No rashes or lesions  Neurological: No numbness, weakness, tingling, no headache  Psychiatric: No depression

## 2023-02-26 NOTE — ED PROVIDER NOTE - OBJECTIVE STATEMENT
57-year-old female with no significant past medical history, recent COVID infection last week presenting with sensation that her hands and feet feel cold over the last several days.  Also endorsing some bilateral cramping of calf, no swelling, no history of DVT or PE, no recent travel.  No numbness or tingling, no other complaints.  No trauma.  ROS as above.

## 2023-02-26 NOTE — ED PROVIDER NOTE - PHYSICAL EXAMINATION
General: Well appearing, in no acute distress  HEENT: Normocephalic, atraumatic, extraocular movements intact  CV: Regular rate  Pulm: No respiratory distress, no tachypnea  Abd: Flat, no gross distension  Ext: warm and well perfused, 2+ DP and radial pulses bilaterally, no calf tenderness or swelling  Skin: No gross rashes or lesions  Neuro: Alert and oriented, moving all extremities, sensation intact

## 2023-02-26 NOTE — ED ADULT TRIAGE NOTE - OTHER COMPLAINTS
dx covid last week. endorsing bilateral leg pain with coldness. +dp bilateral. no cp nor sob, no cough.

## 2023-02-26 NOTE — ED PROVIDER NOTE - NSFOLLOWUPINSTRUCTIONS_ED_ALL_ED_FT
Please stay well hydrated, please return for any worsening symptoms. You should follow up with your primary physician.

## 2023-02-26 NOTE — ED PROVIDER NOTE - CLINICAL SUMMARY MEDICAL DECISION MAKING FREE TEXT BOX
57-year-old female with history of recent COVID infection presenting with sensation  of cold hands and feet, also with bilateral leg cramping.  Exam nonfocal, 2+ symmetric bilateral pulses of upper and lower extremity, no concern for vascular insufficiency.  Given intermittent cramping of legs, will check basic labs including lytes for rule out electrolyte disturbance, CPK for ro rhabdo though low suspicion. Exam not c/w DVT.

## 2023-02-27 DIAGNOSIS — Z86.16 PERSONAL HISTORY OF COVID-19: ICD-10-CM

## 2023-02-27 DIAGNOSIS — R20.8 OTHER DISTURBANCES OF SKIN SENSATION: ICD-10-CM

## 2023-02-27 DIAGNOSIS — R25.2 CRAMP AND SPASM: ICD-10-CM

## 2023-05-23 ENCOUNTER — APPOINTMENT (OUTPATIENT)
Dept: ORTHOPEDIC SURGERY | Facility: CLINIC | Age: 58
End: 2023-05-23
Payer: MEDICAID

## 2023-05-23 VITALS — WEIGHT: 133 LBS | HEIGHT: 64 IN | BODY MASS INDEX: 22.71 KG/M2

## 2023-05-23 DIAGNOSIS — S86.811A STRAIN OF OTHER MUSCLE(S) AND TENDON(S) AT LOWER LEG LEVEL, RIGHT LEG, INITIAL ENCOUNTER: ICD-10-CM

## 2023-05-23 PROCEDURE — XXXXX: CPT | Mod: 1L

## 2023-05-31 PROBLEM — S86.811A STRAIN OF RIGHT CALF MUSCLE: Status: ACTIVE | Noted: 2023-05-31

## 2023-09-15 ENCOUNTER — APPOINTMENT (OUTPATIENT)
Dept: OBGYN | Facility: CLINIC | Age: 58
End: 2023-09-15

## 2023-09-24 ENCOUNTER — EMERGENCY (EMERGENCY)
Facility: HOSPITAL | Age: 58
LOS: 1 days | Discharge: HOME | End: 2023-09-24
Admitting: EMERGENCY MEDICINE
Payer: MEDICAID

## 2023-09-24 VITALS
WEIGHT: 125 LBS | SYSTOLIC BLOOD PRESSURE: 139 MMHG | OXYGEN SATURATION: 100 % | RESPIRATION RATE: 18 BRPM | TEMPERATURE: 98 F | HEART RATE: 99 BPM | DIASTOLIC BLOOD PRESSURE: 78 MMHG

## 2023-09-24 DIAGNOSIS — Z98.89 OTHER SPECIFIED POSTPROCEDURAL STATES: Chronic | ICD-10-CM

## 2023-09-24 LAB
ANION GAP SERPL CALC-SCNC: 12 MMOL/L — SIGNIFICANT CHANGE UP (ref 5–17)
BASOPHILS # BLD AUTO: 0.04 K/UL — SIGNIFICANT CHANGE UP (ref 0–0.2)
BASOPHILS NFR BLD AUTO: 0.5 % — SIGNIFICANT CHANGE UP (ref 0–2)
BUN SERPL-MCNC: 15 MG/DL — SIGNIFICANT CHANGE UP (ref 7–23)
CALCIUM SERPL-MCNC: 9.7 MG/DL — SIGNIFICANT CHANGE UP (ref 8.4–10.5)
CHLORIDE SERPL-SCNC: 104 MMOL/L — SIGNIFICANT CHANGE UP (ref 96–108)
CO2 SERPL-SCNC: 25 MMOL/L — SIGNIFICANT CHANGE UP (ref 22–31)
CREAT SERPL-MCNC: 0.74 MG/DL — SIGNIFICANT CHANGE UP (ref 0.5–1.3)
EGFR: 93 ML/MIN/1.73M2 — SIGNIFICANT CHANGE UP
EOSINOPHIL # BLD AUTO: 0.05 K/UL — SIGNIFICANT CHANGE UP (ref 0–0.5)
EOSINOPHIL NFR BLD AUTO: 0.7 % — SIGNIFICANT CHANGE UP (ref 0–6)
GLUCOSE SERPL-MCNC: 108 MG/DL — HIGH (ref 70–99)
HCT VFR BLD CALC: 39.2 % — SIGNIFICANT CHANGE UP (ref 34.5–45)
HGB BLD-MCNC: 13.3 G/DL — SIGNIFICANT CHANGE UP (ref 11.5–15.5)
IMM GRANULOCYTES NFR BLD AUTO: 0.4 % — SIGNIFICANT CHANGE UP (ref 0–0.9)
LYMPHOCYTES # BLD AUTO: 1.91 K/UL — SIGNIFICANT CHANGE UP (ref 1–3.3)
LYMPHOCYTES # BLD AUTO: 25 % — SIGNIFICANT CHANGE UP (ref 13–44)
MCHC RBC-ENTMCNC: 29.8 PG — SIGNIFICANT CHANGE UP (ref 27–34)
MCHC RBC-ENTMCNC: 33.9 GM/DL — SIGNIFICANT CHANGE UP (ref 32–36)
MCV RBC AUTO: 87.9 FL — SIGNIFICANT CHANGE UP (ref 80–100)
MONOCYTES # BLD AUTO: 0.45 K/UL — SIGNIFICANT CHANGE UP (ref 0–0.9)
MONOCYTES NFR BLD AUTO: 5.9 % — SIGNIFICANT CHANGE UP (ref 2–14)
NEUTROPHILS # BLD AUTO: 5.17 K/UL — SIGNIFICANT CHANGE UP (ref 1.8–7.4)
NEUTROPHILS NFR BLD AUTO: 67.5 % — SIGNIFICANT CHANGE UP (ref 43–77)
NRBC # BLD: 0 /100 WBCS — SIGNIFICANT CHANGE UP (ref 0–0)
PLATELET # BLD AUTO: 291 K/UL — SIGNIFICANT CHANGE UP (ref 150–400)
POTASSIUM SERPL-MCNC: 3.7 MMOL/L — SIGNIFICANT CHANGE UP (ref 3.5–5.3)
POTASSIUM SERPL-SCNC: 3.7 MMOL/L — SIGNIFICANT CHANGE UP (ref 3.5–5.3)
RBC # BLD: 4.46 M/UL — SIGNIFICANT CHANGE UP (ref 3.8–5.2)
RBC # FLD: 12.6 % — SIGNIFICANT CHANGE UP (ref 10.3–14.5)
SODIUM SERPL-SCNC: 141 MMOL/L — SIGNIFICANT CHANGE UP (ref 135–145)
TROPONIN T, HIGH SENSITIVITY RESULT: <6 NG/L — SIGNIFICANT CHANGE UP (ref 0–51)
WBC # BLD: 7.65 K/UL — SIGNIFICANT CHANGE UP (ref 3.8–10.5)
WBC # FLD AUTO: 7.65 K/UL — SIGNIFICANT CHANGE UP (ref 3.8–10.5)

## 2023-09-24 PROCEDURE — 80048 BASIC METABOLIC PNL TOTAL CA: CPT

## 2023-09-24 PROCEDURE — 73030 X-RAY EXAM OF SHOULDER: CPT | Mod: 26

## 2023-09-24 PROCEDURE — 85025 COMPLETE CBC W/AUTO DIFF WBC: CPT

## 2023-09-24 PROCEDURE — 93005 ELECTROCARDIOGRAM TRACING: CPT

## 2023-09-24 PROCEDURE — 93010 ELECTROCARDIOGRAM REPORT: CPT

## 2023-09-24 PROCEDURE — 73030 X-RAY EXAM OF SHOULDER: CPT

## 2023-09-24 PROCEDURE — 36415 COLL VENOUS BLD VENIPUNCTURE: CPT

## 2023-09-24 PROCEDURE — 71046 X-RAY EXAM CHEST 2 VIEWS: CPT | Mod: 26

## 2023-09-24 PROCEDURE — 99285 EMERGENCY DEPT VISIT HI MDM: CPT | Mod: 25

## 2023-09-24 PROCEDURE — 71046 X-RAY EXAM CHEST 2 VIEWS: CPT

## 2023-09-24 PROCEDURE — 99285 EMERGENCY DEPT VISIT HI MDM: CPT

## 2023-09-24 PROCEDURE — 84484 ASSAY OF TROPONIN QUANT: CPT

## 2023-09-24 NOTE — ED PROVIDER NOTE - OBJECTIVE STATEMENT
58 y/o female w/ no sig pmh p/w 2 episodes of L hand cramping today, each lasting approx 20 min.  1st ep around 430 pm, then again this evening. Currently without 'cramping' sensation, but notes slight tingling sensation and states hand "feels weird."  States around 7pm started noticing constant 'pressure' sensation to L upper back/ scapulae region, which has persisted since.  Unchanged with movement or inspiration.  States accidentally hit her L shoulder hard against door 4 days ago, had pain to shoulder immediately following incident, but has not hand pain since.  Denies f/c, cough, cp, sob, palpitations, leg swelling.  Smokes marijuana.  Denies tobacco use, recent travel, exogenous estrogen use, or hx dvt/pe.  Has never had cardiac w/u.  Reports fhx cardiac disease, states father passed away in 40s (reportedly had renal and cardiac dz). 58 y/o female w/ no sig pmh p/w 2 episodes of L hand cramping today, each lasting approx 20 min.  1st ep around 430 pm, then again this evening. Currently without 'cramping' sensation, but notes slight tingling sensation and states hand "feels weird."  States around 7pm started noticing constant 'pressure' sensation to L upper back/ scapular region, which has persisted since.  Unchanged with movement or inspiration.  States accidentally hit her L shoulder hard against door 4 days ago, had pain to shoulder immediately following incident, but has not hand pain since.  Denies f/c, cough, cp, sob, palpitations, leg swelling.  Smokes marijuana.  Denies tobacco use, recent travel, exogenous estrogen use, or hx dvt/pe.  Has never had cardiac w/u.  Reports fhx cardiac disease, states father passed away in 40s (reportedly had renal and cardiac dz). 56 y/o female w/ no sig pmh p/w 2 episodes of L hand cramping today, each lasting approx 20 min.  1st ep around 430 pm, then again this evening. Currently without 'cramping' sensation, but notes slight tingling sensation and states hand "feels weird."  States around 7pm started noticing constant 'pressure' sensation to L upper back/ scapular region, which has persisted since.  Unchanged with movement or inspiration.  States accidentally hit her L shoulder hard against door 4 days ago, had pain to shoulder immediately following incident, but has not had pain since.  Denies f/c, cough, cp, sob, palpitations, leg swelling.  Smokes marijuana.  Denies tobacco use, recent travel, exogenous estrogen use, or hx dvt/pe.  Has never had cardiac w/u.  Reports fhx cardiac disease, states father passed away in 40s (reportedly had renal and cardiac dz).

## 2023-09-24 NOTE — ED PROVIDER NOTE - PHYSICAL EXAMINATION
CONSTITUTIONAL: Awake, alert.  Nontoxic, no acute distress.    HEAD: Normocephalic, atraumatic.    EYES: Conjunctivae clear without exudates or hemorrhage. Sclera is non-icteric.    ENT: Normal appearing external ears, nose, mucous membranes moist.    NECK: supple, trachea midline. No midline or paraspinal ttp. FROM     HEART:  Normal rate, regular rhythm.  Heart sounds S1, S2.  No murmurs, rubs or gallops.    LUNGS:  No acute respiratory distress.  Non-tachypneic and non-labored.  Lungs are clear bilaterally with good aeration.  No wheezing, rales, rhonchi.    MUSCULOSKELETAL:  Normal appearing extremities without obvious deformity, rash, ecchymosis, erythema.  No swelling.  Warm. + point ttp along L mid scapula.  FROM b/l upper and lower extremities.  5/5 strength b/l upper and lower ext.  Sensation and motor function grossly intact.  Strong equal peripheral pulses b/l.   Cap refill < 2 b/l upper and lower ext.  All compartments soft.  radial, median, and ulnar nerve testing intact b/l    BACK: See msk.  No midline ttp or obvious deformity.    SKIN: Skin in warm, dry and intact without rashes or lesions.  Appropriate color for ethnicity.    NEUROLOGICAL:  Awake, alert and oriented x 3.  Normal speech and cognition.  Face symmetric with equal sensation to light touch throughout, No gross motor deficit, 5/5 strength b/l upper ext.  no gross sensory loss to light touch b/l upper ext, normal movement.      PSYCH: Appropriate mood and affect. Good judgment and insight.

## 2023-09-24 NOTE — ED PROVIDER NOTE - NSFOLLOWUPINSTRUCTIONS_ED_ALL_ED_FT
Thank you for visiting [facility] Emergency Department.      We saw you today for arm pain.    PAIN CONTROL:   You may take ibuprofen (Motrin, Advil) 600 mg (3 regular tablets) every 6 hours as needed for pain.  Please take with food.  Stop taking if you develop abdominal pain, dark/ bloody stools.  Do not mix with other NSAIDS (ie. Naproxen, Aleve, Celecoxib).  You may also take acetaminophen (Tylenol) 650-975mg (2-3 regular tablets) or 500-1000mg (1-2 extra strength tablets) every 6 hours as needed for pain.  Do not exceed 4000 mg in 1 day. These medications may be bought over the counter.    I recommend alternating the Ibuprofen and Tylenol so you are getting medications around the clock.  For example take the Ibuprofen, then 3 hours later take the Tylenol, then 3 hours later take the Ibuprofen, and repeat as needed.    Rest. Apply ice to affected area 20 minutes on, then 20 minutes off.  You may repeat throughout the day.  Please wear ACE wrap as instructed. Elevate affected extremity.    If your pain does not improve or worsens despite these measures, you should seek medical attention and/or may need to follow up with a specialist.    Please know that no emergency visit is complete without follow-up with your primary care provider in 1 week.  Please bring copies of all discharge papers and results and show to your doctor.      You may benefit from seeing a cardiologist.      Please continue taking all previous medications as instructed unless we discussed otherwise.     I appreciated your patience and hope you feel better soon.     Return to ER immediately if you develop fevers, chills, chest pain, shortness of breath, worsening and/or any concerning symptoms.

## 2023-09-24 NOTE — ED ADULT NURSE NOTE - OBJECTIVE STATEMENT
57yoF came to ED c/o L arm numbness. Pt states " Thursday I banged my shoulder really hard into the door frame, I ad some pain immediately after but it subsided, Today I was helping my daughter move and around 1600, I got a cramp in my left 4th finger followed with numbness down my arm. When I got home I was having some pain by shoulder blade, went on [employer] and they said I could be having a heart attack". Pt denies any cardiac history, chest pain, SOB, HA, blurry vision n/v/d. Full ROM in L hand and arm. pulses palpable no discoloration. EKG performed

## 2023-09-24 NOTE — ED ADULT NURSE NOTE - CAS TRG GENERAL AIRWAY, MLM
Patent Admission Reconciliation is Completed  Discharge Reconciliation is Not Complete Admission Reconciliation is Completed  Discharge Reconciliation is Completed

## 2023-09-24 NOTE — ED PROVIDER NOTE - NS ED ROS FT
CONSTITUTIONAL: Denies fever and chills    HEENT: Denies cough    RESPIRATORY: Denies SOB    CARDIOVASCULAR: Denies palpitations and chest pain.    GASTROINTESTINAL: Denies abdominal pain, nausea, vomiting and diarrhea.    MUSCULOSKELETAL: See HPI

## 2023-09-24 NOTE — ED PROVIDER NOTE - PATIENT PORTAL LINK FT
You can access the FollowMyHealth Patient Portal offered by [facility] by registering at the following website: [email or web address]. By joining [facility]’s FollowMyHealth portal, you will also be able to view your health information using other applications (apps) compatible with our system.

## 2023-09-24 NOTE — ED PROVIDER NOTE - CLINICAL SUMMARY MEDICAL DECISION MAKING FREE TEXT BOX
58 y/o female w/ no sig pmh p/w 2 episodes of L hand cramping today, each lasting approx 20 min.  1st ep around 430 pm, then again this evening. Currently without 'cramping' sensation, but notes slight tingling sensation and states hand "feels weird."  States around 7pm started noticing constant 'pressure' sensation to L upper back/ scapulae region, which has persisted since.  Unchanged with movement or inspiration.  States accidentally hit her L shoulder hard against door 4 days ago, had pain to shoulder immediately following incident, but has not hand pain since.  Denies f/c, cough, cp, sob, palpitations, leg swelling.  Smokes marijuana.  Denies tobacco use, recent travel, exogenous estrogen use, or hx dvt/pe.  Has never had cardiac w/u.  Reports fhx cardiac disease, states father passed away in 40s (reportedly had renal and cardiac dz).  Exam with point ttp to L mid scapula.  FROM b/l upper ext.  No objective sensory or motor deficits to upper ext  Overall suspect likely msk pain/ strain, in setting of recent trauma to shoulder and seems reproducible on exam  Would like to r/o atypical acs, as pt with fhx young cardiac dz, and pain did not start immediately/persist after injury 4 days ago  Doubt PE, dissection  Plan for ekg, basic labs, trop, cxr and xr L shoulder  Pain meds prn - pt declining currently 56 y/o female w/ no sig pmh p/w 2 episodes of L hand cramping today, each lasting approx 20 min.  1st ep around 430 pm, then again this evening. Currently without 'cramping' sensation, but notes slight tingling sensation and states hand "feels weird."  States around 7pm started noticing constant 'pressure' sensation to L upper back/ scapular region, which has persisted since.  Unchanged with movement or inspiration.  States accidentally hit her L shoulder hard against door 4 days ago, had pain to shoulder immediately following incident, but has not hand pain since.  Denies f/c, cough, cp, sob, palpitations, leg swelling.  Smokes marijuana.  Denies tobacco use, recent travel, exogenous estrogen use, or hx dvt/pe.  Has never had cardiac w/u.  Reports fhx cardiac disease, states father passed away in 40s (reportedly had renal and cardiac dz).  Exam with point ttp to L mid scapula.  FROM b/l upper ext.  No objective sensory or motor deficits to upper ext  Overall suspect likely msk pain/ strain, in setting of recent trauma to shoulder and seems reproducible on exam  Would like to r/o atypical acs, as pt with fhx young cardiac dz, and pain did not start immediately/persist after injury 4 days ago  Doubt PE, dissection  Plan for ekg, basic labs, trop, cxr and xr L shoulder  Pain meds prn - pt declining currently 58 y/o female w/ no sig pmh p/w 2 episodes of L hand cramping today, each lasting approx 20 min.  1st ep around 430 pm, then again this evening. Currently without 'cramping' sensation, but notes slight tingling sensation and states hand "feels weird."  States around 7pm started noticing constant 'pressure' sensation to L upper back/ scapular region, which has persisted since.  Unchanged with movement or inspiration.  States accidentally hit her L shoulder hard against door 4 days ago, had pain to shoulder immediately following incident, but has not had pain since.  Denies f/c, cough, cp, sob, palpitations, leg swelling.  Smokes marijuana.  Denies tobacco use, recent travel, exogenous estrogen use, or hx dvt/pe.  Has never had cardiac w/u.  Reports fhx cardiac disease, states father passed away in 40s (reportedly had renal and cardiac dz).  Exam with point ttp to L mid scapula.  FROM b/l upper ext.  No objective sensory or motor deficits to upper ext  Overall suspect likely msk pain/ strain, in setting of recent trauma to shoulder and seems reproducible on exam  Would like to r/o atypical acs, as pt with fhx young cardiac dz, and pain did not start immediately/persist after injury 4 days ago  Doubt PE, dissection  Plan for ekg, basic labs, trop, cxr and xr L shoulder  Pain meds prn - pt declining currently

## 2023-09-24 NOTE — ED PROVIDER NOTE - CARE PROVIDER_API CALL
Mily Morfin  Cardiovascular Disease  [address], [address] [place] [address]  Phone: [phone]  Fax: [phone]  Follow Up Time:

## 2023-09-24 NOTE — ED ADULT TRIAGE NOTE - ARRIVAL INFO ADDITIONAL COMMENTS
pt developed left hand cramping earlier today while helping her daughter move and now has pain radiating up her arm and into her shoulder.   full rom.  no deformity noted.

## 2023-09-24 NOTE — ED ADULT NURSE NOTE - NSICDXPASTMEDICALHX_GEN_ALL_CORE_FT
Problem: Dysphagia (Adult) Goal: *Acute Goals and Plan of Care (Insert Text) STG: Patient will swallow pureed diet and honey-thick liquids without overt signs or symptoms of aspiration 100% of the time. STG: Patient will perform laryngeal strengthening exercises x10 each with 80% accuracy. LTG: Patient will consume least restrictive diet consistency without respiratory compromise by discharge. Speech language pathology: BEDSIDE SWALLOW ASSESSMENT: Daily Note 1 NAME/AGE/GENDER: Selam Villanueva is a 74 y.o. female DATE: 9/25/2018 PRIMARY DIAGNOSIS: Respiratory failure, acute-on-chronic ([facility] [address] ICD-10: Treatment Diagnosis: oropharyngeal dysphagia (R13.12) INTERDISCIPLINARY COLLABORATION: Radiologist, Dr. Gabrielle Rock PRECAUTIONS/ALLERGIES: Review of patient's allergies indicates no known allergies. ASSESSMENT/PLAN OF CARE:Patient completed modified barium swallow study on 9/24/18- recommendations for puree/honey. Patient expressing desire to continue with free water despite recommendations. Per chart review, she had an extensive conversation with MD yesterday and has elected to continue with free water despite risks of aspiration. During session today, clinician discussed s/s of airway compromise, risks of aspiration with free water, and complications associated with pneumonia. She expresses understanding of information provided and want to continue with free water despite risks. Patient seen for laryngeal exercises this PM. Exercises introduced and completed as outlined below with 80% accuracy and moderate cues. ???? ? ? This section established at most recent assessment???????? 
RECOMMENDATIONS AND PLANNED INTERVENTIONS (Benefits and precautions of therapy have been discussed with the patient.): 
· PO:  Pureed · Liquids:  honey MEDICATIONS: 
· With applesauce COMPENSATORY STRATEGIES/MODIFICATIONS INCLUDING: 
· Upright for all PO 
OTHER RECOMMENDATIONS (including follow up treatment recommendations):  
 · Tongue based expressions · Family training/education · Laryngeal exercises · Patient education FREQUENCY/DURATION: Continue to follow patient 2 times a week for duration of hospital stay to address above goals. RECOMMENDED REHABILITATION/EQUIPMENT: (at time of discharge pending progress): Due to the probability of continued deficits (see above) this patient will likely need continued skilled speech therapy after discharge. SUBJECTIVE: \"All I drink is water. That is all I want. I don't care what you say\" History of Present Injury/Illness: Ms. Main Zuñiga  has a past medical history of Chronic pain; Chronic sinusitis (1/19/2015); Diverticulosis (2017); Edema (1/19/2015); Former smoker; GERD (gastroesophageal reflux disease); Headache (1/19/2015); Hearing loss (1/19/2015); Heart murmur (1990); Hip osteoarthritis (1/19/2015); colonic polyps (2017); migraines; Hypercholesterolemia (1/19/2015); Hypertension; Impaired fasting glucose (1/19/2015); Joint disorder (1/19/2015); Morbid obesity ([address] Neuralgia (1/19/2015); Phlebitis and thrombophlebitis of superficial vessels of lower extremities (01/19/2015); Psychiatric disorder; Restless leg syndrome (1/19/2015); Rheumatic fever; Seizures ([address] SOB (shortness of breath) on exertion; Stroke ([address] Tobacco abuse (1/19/2015); Vascular anomalies, congenital; and Vitamin D deficiency (1/19/2015). She also has no past medical history of Adverse effect of anesthesia; Aneurysm ([address] Arrhythmia; Asthma; Autoimmune disease ([address] CAD (coronary artery disease); Cancer ([address] Chronic kidney disease; Chronic obstructive pulmonary disease ([address] Coagulation disorder ([address] Diabetes ([address] Difficult intubation; Endocarditis; Heart failure ([address] Liver disease; Malignant hyperthermia due to anesthesia; Nausea & vomiting; Pseudocholinesterase deficiency; PUD (peptic ulcer disease); Sleep apnea; Thromboembolus ([address] or Thyroid disease. . She also  has a past surgical history that includes hx mohs procedure (Right, 1995); hx cholecystectomy (1982); colonoscopy (N/A, 2/20/2017); pr total hip arthroplasty (Right, 2005); hx rotator cuff repair (Right, 10/31/2016); hx cataract removal (Bilateral, 3/2016); hx tubal ligation (1982); and hx appendectomy (03/15/2017). Present Symptoms: recent history of CVA with PEG Pain Intensity 1: 0 Pain Location 1: Hip Pain Orientation 1: Right Pain Intervention(s) 1: Medication (see MAR) Current Dietary Status:  PEG Social History/Home Situation:  
Home Environment: Skilled nursing facility [facility] One[facility] Story Residence: One story Living Alone: No 
Support Systems: Skilled nursing facility Patient Expects to be Discharged to[de-identified] Skilled nursing facility Current DME Used/Available at Home: Wheelchair OBJECTIVE:  
Cognitive/Communication Status:  Mental Status Neurologic State: Alert Orientation Level: Oriented X4 Cognition: Follows commands Perception: Appears intact Perseveration: No perseveration noted Safety/Judgement: Awareness of environment LARYNGEAL / PHARYNGEAL EXERCISES: 
  
  
  
Effortful Swallow: Yes Reps : 5 Sets : 1 Hard Glottal Attack: Yes Reps : 5 Sets : 2 Shaker: Yes Sets : 1 Sing \"EEE\": Yes Reps : 5 Sets : 1 Sustained \"ah\": Yes Sets : 1 Reps : 5 Assessment/Reassessment only, no treatment provided today. Tool Used: Dysphagia Outcome and Severity Scale (KAROLINA) Score Comments Normal Diet  [] 7 With no strategies or extra time needed Functional Swallow  [] 6 May have mild oral or pharyngeal delay Mild Dysphagia 
  [] 5 Which may require one diet consistency restricted (those who demonstrate penetration which is entirely cleared on MBS would be included) Mild-Moderate Dysphagia  [] 4 With 1-2 diet consistencies restricted Moderate Dysphagia  [x] 3 With 2 or more diet consistencies restricted Moderately Severe Dysphagia  [] 2 With partial PO strategies (trials with ST only) Severe Dysphagia  [] 1 With inability to tolerate any PO safely Score:  Initial: 3 Most Recent:  (Date:) Interpretation of Tool: The Dysphagia Outcome and Severity Scale (KAROLINA) is a simple, easy-to-use, 7-point scale developed to systematically rate the functional severity of dysphagia based on objective assessment and make recommendations for diet level, independence level, and type of nutrition. Score 7 6 5 4 3 2 1 Modifier CH CI CJ CK CL CM CN ? Swallowing:  
  - CURRENT STATUS: CL - 60%-79% impaired, limited or restricted  - GOAL STATUS:  CK - 40%-59% impaired, limited or restricted  - D/C STATUS:  ---------------To be determined--------------- Payor: Luis Fernando Hess / Plan: [facility] [employer] MEDICARE COMPLETE / Product Type: [employer] Medicare / Safety: After treatment position/precautions: 
· Patient waiting in radiology holding bay for transport back to room. · Results/recommendations called to floor; spoke with RORY Reeves Recommendations for treatment: puree diet, honey thick liquids; laryngeal exercises; thin water with SLP, only if oral cavity is completely clear Total Treatment Duration: 
Time In: 1350 Time Out: [address] CCC-SLP 
 
 
 
 
 PAST MEDICAL HISTORY:  No pertinent past medical history
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
## 2023-09-28 DIAGNOSIS — R25.2 CRAMP AND SPASM: ICD-10-CM

## 2023-09-28 DIAGNOSIS — R20.2 PARESTHESIA OF SKIN: ICD-10-CM
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
## 2023-09-28 DIAGNOSIS — M79.602 PAIN IN LEFT ARM: ICD-10-CM

## 2023-09-28 DIAGNOSIS — M25.512 PAIN IN LEFT SHOULDER: ICD-10-CM

## 2023-11-25 NOTE — HISTORY OF PRESENT ILLNESS
Pt left message on perfect serve with c/o lump in left breast.    [Patient reported mammogram was normal] : Patient reported mammogram was normal [Patient reported PAP Smear was normal] : Patient reported PAP Smear was normal [Patient reported colonoscopy was normal] : Patient reported colonoscopy was normal [postmenopausal] : postmenopausal [N] : Patient does not use contraception [Post-Menopause, No Sxs] : post-menopausal, currently without symptoms [Y] : Patient is sexually active [Monogamous] : is monogamous [Currently Active] : currently active [FreeTextEntry1] : LISET TOVAR is a 56 year old  female that presents without complaints for a routine well woman exam. \par \par Patient reports her last menses was 6 years ago. She denies PMB, pelvic pain, UTI symptoms or vaginal discomfort. Ms. Tovar is sexually active and declines STD testing. \par \par  [Mammogramdate] : 7/2022 [PapSmeardate] : 7/20/21 [TextBox_43] : has appointment this fall [ColonoscopyDate] : 2017 [PGHxTotal] : 2 [[facility] Health[facility]] : 2 [No] : Patient does not have concerns regarding sex